# Patient Record
Sex: MALE | Race: WHITE | NOT HISPANIC OR LATINO | Employment: FULL TIME | URBAN - METROPOLITAN AREA
[De-identification: names, ages, dates, MRNs, and addresses within clinical notes are randomized per-mention and may not be internally consistent; named-entity substitution may affect disease eponyms.]

---

## 2022-04-12 ENCOUNTER — OFFICE VISIT (OUTPATIENT)
Dept: ORTHOPEDIC SURGERY | Facility: CLINIC | Age: 37
End: 2022-04-12

## 2022-04-12 VITALS — HEIGHT: 69 IN | WEIGHT: 170 LBS | BODY MASS INDEX: 25.18 KG/M2 | OXYGEN SATURATION: 98 % | HEART RATE: 65 BPM

## 2022-04-12 DIAGNOSIS — M77.11 RIGHT LATERAL EPICONDYLITIS: ICD-10-CM

## 2022-04-12 DIAGNOSIS — M25.521 RIGHT ELBOW PAIN: Primary | ICD-10-CM

## 2022-04-12 PROCEDURE — 99204 OFFICE O/P NEW MOD 45 MIN: CPT | Performed by: ORTHOPAEDIC SURGERY

## 2022-04-12 RX ORDER — NAPROXEN 500 MG/1
500 TABLET ORAL 2 TIMES DAILY
Qty: 60 TABLET | Refills: 1 | Status: SHIPPED | OUTPATIENT
Start: 2022-04-12 | End: 2022-06-13

## 2022-04-12 NOTE — PROGRESS NOTES
"Chief Complaint  Pain of the Right Elbow     Subjective      Zach Rodriguez presents to [facility] for evaluation of the right elbow. The patient reports elbow pain when gripping. He runs heavy equipment. He has no injury or trauma to the elbow. He has no other complaints. He locates his pain to the lateral elbow.     No Known Allergies     Social History     Socioeconomic History   • Marital status:    Tobacco Use   • Smoking status: Never Smoker   • Smokeless tobacco: Never Used        Review of Systems     Objective   Vital Signs:   Pulse 65   Ht 175.3 cm (69\")   Wt 77.1 kg (170 lb)   SpO2 98%   BMI 25.10 kg/m²       Physical Exam  Constitutional:       Appearance: Normal appearance. The patient is well-developed and normal weight.   HENT:      Head: Normocephalic.      Right Ear: Hearing and external ear normal.      Left Ear: Hearing and external ear normal.      Nose: Nose normal.   Eyes:      Conjunctiva/sclera: Conjunctivae normal.   Cardiovascular:      Rate and Rhythm: Normal rate.   Pulmonary:      Effort: Pulmonary effort is normal.      Breath sounds: No wheezing or rales.   Abdominal:      Palpations: Abdomen is soft.      Tenderness: There is no abdominal tenderness.   Musculoskeletal:      Cervical back: Normal range of motion.   Skin:     Findings: No rash.   Neurological:      Mental Status: The patient is alert and oriented to person, place, and time.   Psychiatric:         Mood and Affect: Mood and affect normal.         Judgment: Judgment normal.       Ortho Exam      Right elbow- pain with resisted wrist extension. No pain with long finger resisted wrist extension. Non-tender to the olecranon. Full extension flexion 155. Neurovascularly intact. Sensation to light touch median, radial, ulnar nerve. Positive AIN, PIN, ulnar nerve. Positive pulses. Full pronation and supination. Mild tenderness to lateral epicondyle.     Procedures    X-Ray " Report:  Right elbow(s) X-Ray  Indication: Evaluation of right elbow pain  AP and Lateral view(s)  Findings: no acute fracture or effusion.   Prior studies available for comparison: no         Imaging Results (Most Recent)     Procedure Component Value Units Date/Time    XR Elbow 2 View Right [[id number]] Resulted: 04/12/22 0931     Updated: 04/12/22 0935           Result Review :       No results found.           Assessment and Plan     DX: lateral epicondylitis, right    Discussed the treatment plan with the patient.  Plan for conservative treatment at this time. I advised him to get an elbow strap OTC. Home exercises given today. Prescription for Naproxen given today.     Call or return if worsening symptoms.    Follow Up     6 weeks.       Patient was given instructions and counseling regarding his condition or for health maintenance advice. Please see specific information pulled into the AVS if appropriate.     Scribed for Rubén Villanueva MD by Collette Brown.  04/12/22   09:43 EDT    I have personally performed the services described in this document as scribed by the above individual and it is both accurate and complete. Rubén Villanueva MD 04/12/22

## 2022-05-26 ENCOUNTER — OFFICE VISIT (OUTPATIENT)
Dept: ORTHOPEDIC SURGERY | Facility: CLINIC | Age: 37
End: 2022-05-26

## 2022-05-26 VITALS — WEIGHT: 170 LBS | OXYGEN SATURATION: 94 % | HEIGHT: 69 IN | BODY MASS INDEX: 25.18 KG/M2 | HEART RATE: 70 BPM

## 2022-05-26 DIAGNOSIS — M25.521 RIGHT ELBOW PAIN: Primary | ICD-10-CM

## 2022-05-26 DIAGNOSIS — M77.11 RIGHT LATERAL EPICONDYLITIS: ICD-10-CM

## 2022-05-26 PROCEDURE — 20551 NJX 1 TENDON ORIGIN/INSJ: CPT | Performed by: PHYSICIAN ASSISTANT

## 2022-05-26 RX ORDER — LIDOCAINE HYDROCHLORIDE 10 MG/ML
1 INJECTION, SOLUTION INFILTRATION; PERINEURAL
Status: COMPLETED | OUTPATIENT
Start: 2022-05-26 | End: 2022-05-26

## 2022-05-26 RX ORDER — TRIAMCINOLONE ACETONIDE 40 MG/ML
40 INJECTION, SUSPENSION INTRA-ARTICULAR; INTRAMUSCULAR
Status: COMPLETED | OUTPATIENT
Start: 2022-05-26 | End: 2022-05-26

## 2022-05-26 RX ADMIN — TRIAMCINOLONE ACETONIDE 40 MG: 40 INJECTION, SUSPENSION INTRA-ARTICULAR; INTRAMUSCULAR at 08:15

## 2022-05-26 RX ADMIN — LIDOCAINE HYDROCHLORIDE 1 ML: 10 INJECTION, SOLUTION INFILTRATION; PERINEURAL at 08:15

## 2022-05-26 NOTE — PROGRESS NOTES
"Chief Complaint  Pain and Follow-up of the Right Elbow    Subjective          Zach Rodriguez is a 37 y.o. male  presents to [facility] for   History of Present Illness      Patient presents for follow-up evaluation of right elbow pain, right lateral epicondylitis.  She was seen by Dr. Villanueva on 4/12/2022 Dr. Villanueva discussed injections with him he refused at that time he has been taking naproxen and using a counterforce strap with some relief.  He states he still has pain with certain movements including gripping/twisting objects.  He is a farmer/runs a farm and he states certain activities caused worse pain in the elbow.  He denies difficulty with range of motion but does admit to weakness and pain in the lateral elbow with certain movements      No Known Allergies     Social History     Socioeconomic History   • Marital status:    Tobacco Use   • Smoking status: Never Smoker   • Smokeless tobacco: Never Used        REVIEW OF SYSTEMS    Constitutional: Denies fevers, chills, weight loss  Cardiovascular: Denies chest pain, shortness of breath  Skin: Denies rashes, acute skin changes  Neurologic: Denies headache, loss of consciousness  MSK: Right elbow pain      Objective   Vital Signs:   Pulse 70   Ht 175.3 cm (69\")   Wt 77.1 kg (170 lb)   SpO2 94%   BMI 25.10 kg/m²     Body mass index is 25.1 kg/m².    Physical Exam    Right elbow: Skin is intact, no erythema, no ecchymosis, no swelling, tender to palpation of the lateral elbow, nontender medial posterior/anterior elbow.  Full range of motion with flexion, extension, supination pronation, 5 out of 5 strength.  Pain with resisted wrist extension, pain with resisted third finger extension    Medium Joint Arthrocentesis: R elbow  Date/Time: 5/26/2022 8:15 AM  Consent given by: patient  Site marked: site marked  Timeout: Immediately prior to procedure a time out was called to verify the correct patient, procedure, " equipment, support staff and site/side marked as required   Supporting Documentation  Indications: pain   Procedure Details  Location: elbow - R elbow  Needle size: 23 G  Medications administered: 1 mL lidocaine 1 %; 40 mg triamcinolone acetonide 40 MG/ML  Patient tolerance: patient tolerated the procedure well with no immediate complications          Imaging Results (Most Recent)     None           Result Review :   The following data was reviewed by: VELIA Uriarte on 05/26/2022:               Assessment and Plan    Diagnoses and all orders for this visit:    1. Right elbow pain (Primary)    2. Right lateral epicondylitis    Other orders  -     Medium Joint Arthrocentesis: R elbow        Discussed diagnosis and treatment options with the patient he will continue naproxen, continue counterforce strap and he elected to have right elbow injection today which he tolerated well, follow-up in 6 weeks for recheck, follow-up sooner if any new or concerning symptoms occur, patient agreed    Call or return if worsening symptoms.    Follow Up   Return in about 6 weeks (around 7/7/2022) for Recheck.  Patient was given instructions and counseling regarding his condition or for health maintenance advice. Please see specific information pulled into the AVS if appropriate.

## 2022-06-11 DIAGNOSIS — M25.521 RIGHT ELBOW PAIN: ICD-10-CM

## 2022-06-11 DIAGNOSIS — M77.11 RIGHT LATERAL EPICONDYLITIS: ICD-10-CM

## 2022-06-13 RX ORDER — NAPROXEN 500 MG/1
TABLET ORAL
Qty: 60 TABLET | Refills: 1 | Status: SHIPPED | OUTPATIENT
Start: 2022-06-13